# Patient Record
Sex: MALE | Race: WHITE
[De-identification: names, ages, dates, MRNs, and addresses within clinical notes are randomized per-mention and may not be internally consistent; named-entity substitution may affect disease eponyms.]

---

## 2017-07-23 ENCOUNTER — HOSPITAL ENCOUNTER (INPATIENT)
Dept: HOSPITAL 47 - EC | Age: 62
LOS: 4 days | Discharge: HOME | DRG: 247 | End: 2017-07-27
Payer: COMMERCIAL

## 2017-07-23 VITALS — BODY MASS INDEX: 24.3 KG/M2

## 2017-07-23 DIAGNOSIS — E11.9: ICD-10-CM

## 2017-07-23 DIAGNOSIS — I25.10: ICD-10-CM

## 2017-07-23 DIAGNOSIS — I31.9: ICD-10-CM

## 2017-07-23 DIAGNOSIS — I25.82: ICD-10-CM

## 2017-07-23 DIAGNOSIS — F41.9: ICD-10-CM

## 2017-07-23 DIAGNOSIS — E78.5: ICD-10-CM

## 2017-07-23 DIAGNOSIS — I21.09: Primary | ICD-10-CM

## 2017-07-23 LAB
ALP SERPL-CCNC: 68 U/L (ref 38–126)
ALT SERPL-CCNC: 35 U/L (ref 21–72)
AMYLASE SERPL-CCNC: 56 U/L (ref 30–110)
ANION GAP SERPL CALC-SCNC: 14 MMOL/L
APTT BLD: 24.8 SEC (ref 22–30)
AST SERPL-CCNC: 60 U/L (ref 17–59)
BASOPHILS # BLD AUTO: 0.1 K/UL (ref 0–0.2)
BASOPHILS NFR BLD AUTO: 0 %
BUN SERPL-SCNC: 20 MG/DL (ref 9–20)
CALCIUM SPEC-MCNC: 9.5 MG/DL (ref 8.4–10.2)
CH: 30.6
CHCM: 34.4
CHLORIDE SERPL-SCNC: 103 MMOL/L (ref 98–107)
CO2 SERPL-SCNC: 23 MMOL/L (ref 22–30)
EOSINOPHIL # BLD AUTO: 0.1 K/UL (ref 0–0.7)
EOSINOPHIL NFR BLD AUTO: 1 %
ERYTHROCYTE [DISTWIDTH] IN BLOOD BY AUTOMATED COUNT: 5.37 M/UL (ref 4.3–5.9)
ERYTHROCYTE [DISTWIDTH] IN BLOOD: 14.4 % (ref 11.5–15.5)
GLUCOSE BLD-MCNC: 168 MG/DL (ref 75–99)
GLUCOSE SERPL-MCNC: 171 MG/DL (ref 74–99)
HCT VFR BLD AUTO: 47.9 % (ref 39–53)
HDW: 2.52
HGB BLD-MCNC: 16.2 GM/DL (ref 13–17.5)
INR PPP: 1 (ref ?–1.2)
LUC NFR BLD AUTO: 1 %
LYMPHOCYTES # SPEC AUTO: 0.9 K/UL (ref 1–4.8)
LYMPHOCYTES NFR SPEC AUTO: 7 %
MAGNESIUM SPEC-SCNC: 1.8 MG/DL (ref 1.6–2.3)
MCH RBC QN AUTO: 30.2 PG (ref 25–35)
MCHC RBC AUTO-ENTMCNC: 33.8 G/DL (ref 31–37)
MCV RBC AUTO: 89.3 FL (ref 80–100)
MONOCYTES # BLD AUTO: 0.9 K/UL (ref 0–1)
MONOCYTES NFR BLD AUTO: 7 %
NEUTROPHILS # BLD AUTO: 10.3 K/UL (ref 1.3–7.7)
NEUTROPHILS NFR BLD AUTO: 84 %
NON-AFRICAN AMERICAN GFR(MDRD): >60
POTASSIUM SERPL-SCNC: 3.8 MMOL/L (ref 3.5–5.1)
PROT SERPL-MCNC: 7.2 G/DL (ref 6.3–8.2)
PT BLD: 10.5 SEC (ref 9–12)
SODIUM SERPL-SCNC: 140 MMOL/L (ref 137–145)
WBC # BLD AUTO: 0.14 10*3/UL
WBC # BLD AUTO: 12.3 K/UL (ref 3.8–10.6)
WBC (PEROX): 11.1

## 2017-07-23 PROCEDURE — 82150 ASSAY OF AMYLASE: CPT

## 2017-07-23 PROCEDURE — 83036 HEMOGLOBIN GLYCOSYLATED A1C: CPT

## 2017-07-23 PROCEDURE — 93306 TTE W/DOPPLER COMPLETE: CPT

## 2017-07-23 PROCEDURE — 85610 PROTHROMBIN TIME: CPT

## 2017-07-23 PROCEDURE — 86618 LYME DISEASE ANTIBODY: CPT

## 2017-07-23 PROCEDURE — 85025 COMPLETE CBC W/AUTO DIFF WBC: CPT

## 2017-07-23 PROCEDURE — 83880 ASSAY OF NATRIURETIC PEPTIDE: CPT

## 2017-07-23 PROCEDURE — 93005 ELECTROCARDIOGRAM TRACING: CPT

## 2017-07-23 PROCEDURE — 81003 URINALYSIS AUTO W/O SCOPE: CPT

## 2017-07-23 PROCEDURE — 80061 LIPID PANEL: CPT

## 2017-07-23 PROCEDURE — 94760 N-INVAS EAR/PLS OXIMETRY 1: CPT

## 2017-07-23 PROCEDURE — 83735 ASSAY OF MAGNESIUM: CPT

## 2017-07-23 PROCEDURE — 71010: CPT

## 2017-07-23 PROCEDURE — 85347 COAGULATION TIME ACTIVATED: CPT

## 2017-07-23 PROCEDURE — 83690 ASSAY OF LIPASE: CPT

## 2017-07-23 PROCEDURE — 82553 CREATINE MB FRACTION: CPT

## 2017-07-23 PROCEDURE — 36415 COLL VENOUS BLD VENIPUNCTURE: CPT

## 2017-07-23 PROCEDURE — 82550 ASSAY OF CK (CPK): CPT

## 2017-07-23 PROCEDURE — 84484 ASSAY OF TROPONIN QUANT: CPT

## 2017-07-23 PROCEDURE — 85730 THROMBOPLASTIN TIME PARTIAL: CPT

## 2017-07-23 PROCEDURE — 80048 BASIC METABOLIC PNL TOTAL CA: CPT

## 2017-07-23 PROCEDURE — 80053 COMPREHEN METABOLIC PANEL: CPT

## 2017-07-23 PROCEDURE — 93458 L HRT ARTERY/VENTRICLE ANGIO: CPT

## 2017-07-23 NOTE — ED
General Adult HPI





- General


Source: patient, RN notes reviewed


Mode of arrival: ambulatory


Limitations: no limitations





<Amanda Hopson - Last Filed: 07/24/17 00:13>





<René Pimentel - Last Filed: 07/24/17 00:20>





- General


Chief complaint: Abdominal Pain


Stated complaint: pancreatic pain


Time Seen by Provider: 07/23/17 22:34





- History of Present Illness


Initial comments: 





70-year-old male presents to the emergency department with a chief complaint of 

abdominal pain.  Patient states she's having this left-sided abdominal pain 

radiates up into his left shoulder.  Patient states that he has a history of 

pancreatitis but states that he has an attack a few years but has increased 

pain today.  Patient denies any vomiting with this.  Patient denies any changes 

in urination.  Patient was concerned due to the symptoms so he thought that he 

should be evaluated. Patient denies any recent fever, chills, shortness of 

breath, chest pain, back pain, nausea vomiting, numbness or tingling, dysuria 

or hematuria, constipation or diarrhea, headaches or visual changes, or any 

other current symptoms. (Amanda Hopson)





- Related Data


 Allergies











Allergy/AdvReac Type Severity Reaction Status Date / Time


 


No Known Allergies Allergy   Verified 07/23/17 22:31














Review of Systems


ROS Other: All systems not noted in ROS Statement are negative.





<Amanda Hopson - Last Filed: 07/24/17 00:13>


ROS Other: All systems not noted in ROS Statement are negative.





<René Pimentel - Last Filed: 07/24/17 00:20>


ROS Statement: 


Those systems with pertinent positive or pertinent negative responses have been 

documented in the HPI.








Past Medical History


Past Medical History: Diabetes Mellitus


History of Any Multi-Drug Resistant Organisms: None Reported


Past Surgical History: No Surgical Hx Reported


Past Psychological History: No Psychological Hx Reported


Smoking Status: Never smoker


Past Alcohol Use History: None Reported


Past Drug Use History: None Reported





<Amanda Hopson - Last Filed: 07/24/17 00:13>





General Exam


Limitations: no limitations





<Amanda Hopson - Last Filed: 07/24/17 00:13>





<René Pimentel - Last Filed: 07/24/17 00:20>





- General Exam Comments


Initial Comments: 





General:  The patient is awake and alert, in no distress, and does not appear 

acutely ill. 


Eye:  Pupils are equal, round and reactive to light, extra-ocular movements are 

intact; there is normal conjunctiva bilaterally.  No signs of icterus.  


Ears, nose, mouth and throat:  There are moist mucous membranes.


Neck:  The neck is supple, there is no tenderness.


Cardiovascular:  There is a regular rate and rhythm. No murmur, rub or gallop 

is appreciated.


Respiratory:  Lungs are clear to auscultation, respirations are non-labored, 

breath sounds are equal.  No wheezes, stridor, rales, or rhonchi.


Gastrointestinal:  Soft, non-distended, non-tender abdomen without masses or 

organomegaly noted. There is no rebound or guarding present.  No CVA 

tenderness. Bowel sounds are unremarkable.


Back:  There is no tenderness to palpation in the midline. There is no obvious 

deformity. No rashes noted. 


Musculoskeletal:  Normal ROM, no tenderness, There is no pedal edema. There is 

no calf tenderness or swelling. Sensation intact. Pulses equal bilaterally 2+.  


Neurological:  CN II-XII intact, There are no obvious motor or sensory 

deficits. Coordination appears grossly intact. Speech is normal.


Skin:  Skin is warm and dry and no rashes or lesions are noted. 


Psychiatric:  Cooperative, appropriate mood & affect, normal judgment.  


 (Amanda Hopson)





Course





<Amanda Hopson - Last Filed: 07/24/17 00:13>





<René Pimentel - Last Filed: 07/24/17 00:20>





 Vital Signs











  07/23/17





  22:27


 


Temperature 100.5 F H


 


Pulse Rate 108 H


 


Respiratory 20





Rate 


 


Blood Pressure 138/83


 


O2 Sat by Pulse 98





Oximetry 














- Reevaluation(s)


Reevaluation #1: 





07/23/17 23:54


On further questioning at this time patient does admit that he did have some 

chest pressure earlier today patient states he also had chest pain yesterday.  

and he did have a pain radiating down his left arm numbness and tingling to the 

left arm..  At this time patient is pain-free.  We will give him an aspirin.





 (Amanda Hopson)





EKG Findings





- EKG Comments:


EKG Findings:: Normal sinus rhythm, left axis deviation, left ventricular 

hypertrophy, prolonged QT, concern for ST elevation in V4 to V5 the 6 and V3 no 

reciprocal changes appreciated





<Amanda Hopson - Last Filed: 07/24/17 00:13>





Medical Decision Making





- Lab Data


Result diagrams: 


 07/23/17 23:20





 07/23/17 23:20





- Radiology Data


Radiology results: report reviewed, image reviewed





<Amanda Hopson - Last Filed: 07/24/17 00:13>





- Lab Data


Result diagrams: 


 07/23/17 23:20





 07/23/17 23:20





<René Pimentel - Last Filed: 07/24/17 00:20>





- Medical Decision Making





62-year-old male presents to the emergency department with a chief complaint of 

abdominal pain.  At this time the patient's lab work is been reviewed.  An EKG 

was received and there is concern for possible acute MI.  Cardiology was 

contacted via Dr. Pimentel and they are requesting that the patient go to the 

Cath Lab.  At this time STEMI was alerted.  Patient is given an aspirin here in 

the emergency department.  Family was informed.  This time patient will be 

going to the Cath Lab for additional workup. (Amanda Hopson)





62-year-old male presenting with chief complaint of abdominal pain.  Patient 

was pain-free upon arrival.  After initial history was obtained patient did 

report some abdominal pain and left arm pain.  EKG was obtained at 2338.  EKG 

shows ST segment elevation in the precordium, there is no reciprocal change 

noted.  EKG showed ventricular rate of 70, ME interval 138, QS duration 104, 

QTC is prolonged at 501.  Cardiology was called at 2354.  Cath Lab was 

activated.  Patient was given aspirin and heparin bolus in the emergency 

department.  Patient will be taken emergently to the Cath Lab.





Diagnosis: ST segment elevation MI (René Pimentel)





- Lab Data





 Lab Results











  07/23/17 07/23/17 07/23/17 Range/Units





  23:09 23:20 23:20 


 


WBC    12.3 H  (3.8-10.6)  k/uL


 


RBC    5.37  (4.30-5.90)  m/uL


 


Hgb    16.2  (13.0-17.5)  gm/dL


 


Hct    47.9  (39.0-53.0)  %


 


MCV    89.3  (80.0-100.0)  fL


 


MCH    30.2  (25.0-35.0)  pg


 


MCHC    33.8  (31.0-37.0)  g/dL


 


RDW    14.4  (11.5-15.5)  %


 


Plt Count    215  (150-450)  k/uL


 


Neutrophils %    84  %


 


Lymphocytes %    7  %


 


Monocytes %    7  %


 


Eosinophils %    1  %


 


Basophils %    0  %


 


Neutrophils #    10.3 H  (1.3-7.7)  k/uL


 


Lymphocytes #    0.9 L  (1.0-4.8)  k/uL


 


Monocytes #    0.9  (0-1.0)  k/uL


 


Eosinophils #    0.1  (0-0.7)  k/uL


 


Basophils #    0.1  (0-0.2)  k/uL


 


PT     (9.0-12.0)  sec


 


INR     (<1.2)  


 


APTT     (22.0-30.0)  sec


 


Sodium   140   (137-145)  mmol/L


 


Potassium   3.8   (3.5-5.1)  mmol/L


 


Chloride   103   ()  mmol/L


 


Carbon Dioxide   23   (22-30)  mmol/L


 


Anion Gap   14   mmol/L


 


BUN   20   (9-20)  mg/dL


 


Creatinine   0.80   (0.66-1.25)  mg/dL


 


Est GFR (MDRD) Af Amer   >60   (>60 ml/min/1.73 sqM)  


 


Est GFR (MDRD) Non-Af   >60   (>60 ml/min/1.73 sqM)  


 


Glucose   171 H   (74-99)  mg/dL


 


POC Glucose (mg/dL)  168 H    (75-99)  mg/dL


 


POC Glu Operater ID  Liliana Ruby    


 


Calcium   9.5   (8.4-10.2)  mg/dL


 


Magnesium   1.8   (1.6-2.3)  mg/dL


 


Total Bilirubin   1.1   (0.2-1.3)  mg/dL


 


AST   60 H   (17-59)  U/L


 


ALT   35   (21-72)  U/L


 


Alkaline Phosphatase   68   ()  U/L


 


Total Protein   7.2   (6.3-8.2)  g/dL


 


Albumin   4.1   (3.5-5.0)  g/dL


 


Amylase   56   ()  U/L


 


Lipase   87   ()  U/L














  07/23/17 Range/Units





  23:20 


 


WBC   (3.8-10.6)  k/uL


 


RBC   (4.30-5.90)  m/uL


 


Hgb   (13.0-17.5)  gm/dL


 


Hct   (39.0-53.0)  %


 


MCV   (80.0-100.0)  fL


 


MCH   (25.0-35.0)  pg


 


MCHC   (31.0-37.0)  g/dL


 


RDW   (11.5-15.5)  %


 


Plt Count   (150-450)  k/uL


 


Neutrophils %   %


 


Lymphocytes %   %


 


Monocytes %   %


 


Eosinophils %   %


 


Basophils %   %


 


Neutrophils #   (1.3-7.7)  k/uL


 


Lymphocytes #   (1.0-4.8)  k/uL


 


Monocytes #   (0-1.0)  k/uL


 


Eosinophils #   (0-0.7)  k/uL


 


Basophils #   (0-0.2)  k/uL


 


PT  10.5  (9.0-12.0)  sec


 


INR  1.0  (<1.2)  


 


APTT  24.8  (22.0-30.0)  sec


 


Sodium   (137-145)  mmol/L


 


Potassium   (3.5-5.1)  mmol/L


 


Chloride   ()  mmol/L


 


Carbon Dioxide   (22-30)  mmol/L


 


Anion Gap   mmol/L


 


BUN   (9-20)  mg/dL


 


Creatinine   (0.66-1.25)  mg/dL


 


Est GFR (MDRD) Af Amer   (>60 ml/min/1.73 sqM)  


 


Est GFR (MDRD) Non-Af   (>60 ml/min/1.73 sqM)  


 


Glucose   (74-99)  mg/dL


 


POC Glucose (mg/dL)   (75-99)  mg/dL


 


POC Glu Operater ID   


 


Calcium   (8.4-10.2)  mg/dL


 


Magnesium   (1.6-2.3)  mg/dL


 


Total Bilirubin   (0.2-1.3)  mg/dL


 


AST   (17-59)  U/L


 


ALT   (21-72)  U/L


 


Alkaline Phosphatase   ()  U/L


 


Total Protein   (6.3-8.2)  g/dL


 


Albumin   (3.5-5.0)  g/dL


 


Amylase   ()  U/L


 


Lipase   ()  U/L














Disposition


Time of Disposition: 00:07


Decision Date: 07/24/17


Decision Time: 00:07





<Amanda Hopson - Last Filed: 07/24/17 00:13>





<René Pimentel - Last Filed: 07/24/17 00:20>


Clinical Impression: 


 STEMI (ST elevation myocardial infarction)





Disposition: ADMITTED AS IP TO THIS HOSP


Condition: Critical

## 2017-07-24 LAB
CK SERPL-CCNC: 448 U/L (ref 55–170)
GLUCOSE BLD-MCNC: 127 MG/DL (ref 75–99)
GLUCOSE BLD-MCNC: 146 MG/DL (ref 75–99)
GLUCOSE BLD-MCNC: 153 MG/DL (ref 75–99)
GLUCOSE BLD-MCNC: 163 MG/DL (ref 75–99)
GLUCOSE BLD-MCNC: 245 MG/DL (ref 75–99)
GLUCOSE UR QL: (no result)
HEMOGLOBIN A1C: 8.1 % (ref 4.2–6.1)
KETONES UR QL STRIP.AUTO: (no result)
PH UR: 5.5 [PH] (ref 5–8)
SP GR UR: >1.05 (ref 1–1.03)
TROPONIN I SERPL-MCNC: 7.79 NG/ML (ref 0–0.03)
UA BILLING (MACRO VS. MICRO): (no result)
UROBILINOGEN UR QL STRIP: <2 MG/DL (ref ?–2)

## 2017-07-24 PROCEDURE — B2151ZZ FLUOROSCOPY OF LEFT HEART USING LOW OSMOLAR CONTRAST: ICD-10-PCS

## 2017-07-24 PROCEDURE — B2111ZZ FLUOROSCOPY OF MULTIPLE CORONARY ARTERIES USING LOW OSMOLAR CONTRAST: ICD-10-PCS

## 2017-07-24 PROCEDURE — 4A023N7 MEASUREMENT OF CARDIAC SAMPLING AND PRESSURE, LEFT HEART, PERCUTANEOUS APPROACH: ICD-10-PCS

## 2017-07-24 PROCEDURE — 027034Z DILATION OF CORONARY ARTERY, ONE ARTERY WITH DRUG-ELUTING INTRALUMINAL DEVICE, PERCUTANEOUS APPROACH: ICD-10-PCS

## 2017-07-24 RX ADMIN — INSULIN LISPRO SCH UNIT: 100 INJECTION, SOLUTION INTRAVENOUS; SUBCUTANEOUS at 09:18

## 2017-07-24 RX ADMIN — DOCUSATE SODIUM PRN MG: 100 CAPSULE, LIQUID FILLED ORAL at 21:07

## 2017-07-24 RX ADMIN — MAGNESIUM SULFATE IN DEXTROSE SCH MLS/HR: 10 INJECTION, SOLUTION INTRAVENOUS at 06:39

## 2017-07-24 RX ADMIN — MAGNESIUM SULFATE IN DEXTROSE SCH MLS/HR: 10 INJECTION, SOLUTION INTRAVENOUS at 05:37

## 2017-07-24 RX ADMIN — NITROGLYCERIN ONE MCG: 10 INJECTION INTRAVENOUS at 01:14

## 2017-07-24 RX ADMIN — NITROGLYCERIN ONE MCG: 10 INJECTION INTRAVENOUS at 01:19

## 2017-07-24 RX ADMIN — SPIRONOLACTONE SCH MG: 25 TABLET, FILM COATED ORAL at 09:19

## 2017-07-24 RX ADMIN — ACETAMINOPHEN PRN MG: 325 TABLET, FILM COATED ORAL at 21:06

## 2017-07-24 RX ADMIN — INSULIN LISPRO SCH UNIT: 100 INJECTION, SOLUTION INTRAVENOUS; SUBCUTANEOUS at 18:41

## 2017-07-24 RX ADMIN — ASPIRIN 81 MG CHEWABLE TABLET SCH MG: 81 TABLET CHEWABLE at 09:18

## 2017-07-24 RX ADMIN — METOPROLOL TARTRATE SCH MG: 25 TABLET, FILM COATED ORAL at 09:19

## 2017-07-24 RX ADMIN — INSULIN LISPRO SCH UNIT: 100 INJECTION, SOLUTION INTRAVENOUS; SUBCUTANEOUS at 22:19

## 2017-07-24 RX ADMIN — LISINOPRIL SCH MG: 10 TABLET ORAL at 17:04

## 2017-07-24 RX ADMIN — INSULIN LISPRO SCH UNIT: 100 INJECTION, SOLUTION INTRAVENOUS; SUBCUTANEOUS at 13:30

## 2017-07-24 RX ADMIN — METOPROLOL TARTRATE SCH MG: 25 TABLET, FILM COATED ORAL at 21:08

## 2017-07-24 NOTE — CC
Mr. Lea is a 62 -year-old gentleman who had an episode of chest discomfort 
yesterday and this lasted four about an hour and a half and then subsequently 
he had reoccurrence of the pain today.  The pain today was pleuritic in nature.
  EKG is suggestive of recent anterior myocardial infarction with diffuse ST 
segment elevation .  Clinically at present, the patient did have evidence of 
pericardia rub, suggestive of post myocardial infarction, pericarditis. The 
patient possibly had myocardial infarction more than 24 hours ago but in view 
of the persistent ST segment elevation, the patient was advised cardiac 
catheterization for definitive diagnosis.  



PROCEDURE:   Right groin was prepped and draped in the usual manner.  The skin 
was infiltrated with 2% Xylocaine.   The right femoral artery was entered using 
Seldinger technique and #6 Eston[place] sheath was placed in.  Selective coronary 
angiography was then performed in multiple projections.  Left ventriculography 
was performed.  The patient tolerated the procedure well.   



HEMODYNAMICS:  Left ventricular end diastolic pressure was 12 to 14 mmHg prior 
to angiography.  No gradient is noted across the aortic valve.  



SELECTIVE CORONARY ANGIOGRAPHY: 



LEFT MAIN:  Left main coronary artery is normal. 

LAD:  Good caliber blood vessels.  The mid LAD is totally occluded.  Proximal 
to that, it does not fill very well and it is possible that it is full of 
clots.  

CIRCUMFLEX CORONARY ARTERY:  Good caliber blood vessel and gives rise to good 
sized obtuse marginal branch.  

The second obtuse marginal branch has 90% stenosis.  The right coronary artery 
is mildly diffusely diseased and PDA branch has 90% stenosis.  Beyond that, the 
PDA branch is a very small caliber blood vessel and is diffusely diseased.  



FINAL IMPRESSION: This study shows the mid LAD is totally occluded.  There are 
filling defects in the proximal to that suggestive of possible thrombus. The 
second obtuse marginal branch has 90% stenosis.  The PDA branch has 99% 
stenosis.  Beyond that, the PDA branch is a very small caliber blood vessel.  
Left ventriculography  reveals anterior apical and inferior apical hypokinesia 
with ejection fraction of 35%. 



RECOMMENDATIONS: Films were reviewed with Dr. Faria. We will proceed with 
stent to the LAD. 
[facility]

## 2017-07-24 NOTE — CONS
Mr. Lea is a 62-year-old gentleman who is seen in the emergency room with 
history suggestive of acute myocardial infarction.  This patient gives a 
history that he actually had an episode of chest discomfort with heaviness in 
the chest yesterday which lasted for about an hour or more than an hour.  It 
was not associated with any nausea, vomiting, or shortness of breath.  
Subsequently, he got better.  Today, the patient started around 7:00.  The pain 
was on the left side and also in the left shoulder and pain was sharp.  He also 
had some chills associated with it.  EKG was performed which shows evidence of 
ST segment elevation suggestive of acute myocardial infarction with T-wave 
inversions noted from V3 to V6.  Patient denies any past history of angina.  He 
has a history of diabetes.  No history of hypertension.  He is otherwise 
physically active.



PAST MEDICAL HISTORY:  Includes the history of cholecystectomy.



REVIEW OF SYSTEM:  Is otherwise unremarkable.



Physical examination at present revealed a 62-year-old gentleman who does not 
appear to be in any acute distress.  Patient's heart rate is 60 per minute, the 
blood pressure is 130/80 mmHg.

HEENT examination is negative.  Neck is supple. There is no increase in jugular 
venous pressure.  Both the carotid pulses are felt.  There is no bruit.  Chest 
is symmetrical.  Heart, the PMI is not felt.  First and second heart sounds are 
normal.  There is evidence of pericardial rub.  Lungs are clinically clear to 
auscultation and percussion.  Abdomen is soft.  Extremities very thorough 
pulses and 2+.  



EKG shows normal sinus rhythm with diffuse ST segment elevation from V3 to V6 
as well as in the inferior leads with evidence of T-wave inversions noted in V3 
to V6.  Patient's electrolytes are normal.  Chest x-ray is normal. Troponin is 
7.7.



FINAL IMPRESSION:  It appears that the patient most likely suffered an acute 
myocardial infarction 24 hours ago and now he has evidence of post-myocardial 
infarction, pericarditis and pericardial rub.  EKG is suggestive of recent 
anterior wall myocardial infarction.



RECOMMENDATIONS:  We will proceed with cardiac catheterization for definitive 
diagnosis.  The procedure and risks were fully explained to the patient.  
[facility]

## 2017-07-24 NOTE — PCN
Mr. Lea is a 62-year-old male who presented to the emergency room with 
symptoms of chest discomfort.  His pain started yesterday. His EKG showed ST 
segment elevation in the anterior leads and he had elevation of troponin, 
underwent cardiac catheterization by Dr. ELISABETH Aguilar, was found to have 
subtotally occluded mid LAD in long segment. In view of that the recommendation 
was made regarding angioplasty and stenting. the procedure as well as risks and 
complications were discussed with the patient who is in full understanding and 
agreement. 



PROCEDURE: A 6-Bhutanese FR4 guiding catheter was introduced into the system. 
After cannulating the left main a 0.014 balanced medium weight J wire was 
advanced across the lesion and positioned distally and then a 2.5 x 12 mm trek 
balloon was advanced and 2 inflations at maximum of 8 atmospheres were done. 
Following that the balloon was removed and 2.25 x 23 mm Xience alpine stent was 
deployed. Post dilated at 14 atmospheres. After the last inflation after 
appropriate wait, the balloon and guidewire were withdrawn back into the 
guiding catheter. Images were obtained and repeated. Those images revealed 
stable successful stenting. At that point, the guiding catheter, the balloon 
and guidewire were removed. The sheath was removed. Hemostasis was obtained 
with deployment of Angio-Seal. there were no immediate compilations. The 
patient was returned to his room in stable condition. Of note the patient 
received Angiomax per protocol as well as oral loading dose of Effient. He had  
chest discomfort with the inflation that resolved at the end of the procedure. 



RESULTS:  

Successful stenting of the long segment of the mid distal left anterior 
descending with reduction in stenosis from 99% to 0%. There was diffuse disease 
in the very distal segment of the LAD. 



RECOMMENDATIONS:  The patient will be continued on aspirin, Effient, beta-
blocker and simvastatin. The importance of dual antiplatelet treatment was 
discussed with the patient and his family who are in full understanding and 
agreement. 



DURATION OF THE PROCEDURE: 28 minutes.  
GONS[facility]

## 2017-07-24 NOTE — ECHOF
Referral Reason:mi



MEASUREMENTS

--------

HEIGHT: 180.3 cm

WEIGHT: 78.0 kg

BP: 121/71

RVIDd:   3.1 cm     (< 3.3)

IVSd:   1.3 cm     (0.6 - 1.1)

LVIDd:   5.1 cm     (3.9 - 5.3)

LVPWd:   1.3 cm     (0.6 - 1.1)

IVSs:   1.6 cm

LVIDs:   4.0 cm

LVPWs:   1.5 cm

LA Diam:   3.7 cm     (2.7 - 3.8)

LAESV Index (A-L):   19.37 ml/m

Ao Diam:   3.6 cm     (2.0 - 3.7)

AV Cusp:   2.4 cm     (1.5 - 2.6)

MV EXCURSION:   13.015 mm     (> 18.000)

MV EF SLOPE:   53 mm/s     (70 - 150)

EPSS:   1.3 cm

MV E Sourav:   0.56 m/s

MV DecT:   252 ms

MV A Sourav:   0.58 m/s

MV E/A Ratio:   0.96 

RAP:   15.00 mmHg

RVSP:   31.00 mmHg







FINDINGS

--------

Sinus rhythm.

This was a technically good study.

The left ventricular size is normal.   There is mild 

concentric left ventricular hypertrophy.   Overall left 

ventricular systolic function is mild-moderately 

impaired with, an EF between 40 - 45 %.   Basal 

inferior LV wall motion is hypokinetic.    Apical 

anterior LV wall motion is hypokinetic.    Apical 

lateral LV wall motion is hypokinetic.    Apical 

inferior LV wall motion is hypokinetic.    Apical 

septum LV wall motion is hypokinetic.

The right ventricle is normal in size.

Normal LA  size by volume 22+/-6 ml/m2.

The right atrium is normal in size.

The aortic valve is trileaflet and appears structurally 

normal.

The mitral valve is normal.

Mild tricuspid regurgitation present.   Right 

ventricular systolic pressure is normal at < 35 mmHg.

Pulmonic valve appears structurally normal.

The aortic root size is normal.

The inferior vena cava is dilated with poor inspiratory 

collapse which is consistent with estimated right 

atrial pressure of 20 mmHg.

There is no pericardial effusion.



CONCLUSIONS

--------

1. Sinus rhythm.

2. Apical septum LV wall motion is hypokinetic.

3. The right ventricle is normal in size.

4. Normal LA size by volume 22+/-6 ml/m2.

5. The aortic valve is trileaflet and appears structurally 

normal.

6. The mitral valve is normal.

7. Mild tricuspid regurgitation present.

8. Right ventricular systolic pressure is normal at < 35 

mmHg.

9. Pulmonic valve appears structurally normal.

10. The aortic root size is normal.

11. The inferior vena cava is dilated with poor inspiratory 

collapse which is consistent with estimated right 

atrial pressure of 20 mmHg.

12. This was a technically good study.

13. There is no pericardial effusion.

14. The left ventricular size is normal.

15. There is mild concentric left ventricular hypertrophy.

16. Overall left ventricular systolic function is 

mild-moderately impaired with, an EF between 40 - 45 %.

17. Basal inferior LV wall motion is hypokinetic.

18. Apical anterior LV wall motion is hypokinetic.

19. Apical lateral LV wall motion is hypokinetic.

20. Apical inferior LV wall motion is hypokinetic.





SONOGRAPHER: Denisse Sabillon RDCS

## 2017-07-24 NOTE — P.PN
Subjective


Principal diagnosis: 


Subacute anterolateral MI, pericarditis








This is a 62-year-old gentleman was evaluated by cardiac catheterization last 

night for findings of subacute anterior wall MI.  Patient had stent placement 

of the left anterior descending coronary artery.  He also has a tight stenosis 

involving the OM branch of the circumflex.  Patient is complaining of some 

chest discomfort which is different than the pain that he had previously.  The 

pain increases on deep breathing.  There appears to be pericardial rub and 

pain.  Most probably is related to pericarditis.  He denies any shortness of 

breath and overall is feeling better





Objective





- Vital Signs


Vital signs: 


 Vital Signs











Temp  98.6 F   07/24/17 08:00


 


Pulse  80   07/24/17 14:00


 


Resp  26 H  07/24/17 14:00


 


BP  115/73   07/24/17 12:00


 


Pulse Ox  98   07/24/17 12:00








 Intake & Output











 07/23/17 07/24/17 07/24/17





 18:59 06:59 18:59


 


Intake Total  971 300


 


Output Total  500 


 


Balance  471 300


 


Weight  79 kg 


 


Intake:   


 


  IV  771 300


 


    Magnesium Sulfate-D5w Pmx  200 





    1 gm In Dextrose/Water 1   





    100ml.bag @ 100 mls/hr   





    IVPB Q1H [facility] Rx#:   





    [id number]   


 


    Sodium Chloride 0.9% 1,  500 300





    000 ml @ 100 mls/hr IV .   





    Q10H [facility] Rx#:[id number]   


 


  Oral  200 


 


Output:   


 


  Urine  500 


 


Other:   


 


  Voiding Method  Urinal Urinal


 


  # Voids   1














- Exam


GENERAL EXAM: Patient is alert and oriented and doesn't appear to be in any 

acute distress


HEENT: Normocephalic. Normal reaction of pupils, equal size, normal range of 

extraocular motion. No erythema or exudates in the throat.


NECK: No masses, no nuchal rigidity.


CHEST: No chest wall deformity.


LUNGS: Equal air entry with no crackles or wheeze.


HEART: S1 and S2 normal with no audible mumurs or gallops.  Pericardial rub 

heard.


ABDOMEN: No hepatosplenomegaly, normal bowel sounds, no guarding or rigidity.


SKIN: No rashes


CENTRAL NERVOUS SYSTEM: No focal deficits.


EXTREMITIES: No cyanosis, clubbing or edema.








- Labs


CBC & Chem 7: 


 07/23/17 23:20





 07/23/17 23:20


Labs: 


 Abnormal Lab Results - Last 24 Hours (Table)











  07/23/17 07/23/17 07/23/17 Range/Units





  23:09 23:20 23:20 


 


WBC     (3.8-10.6)  k/uL


 


Neutrophils #     (1.3-7.7)  k/uL


 


Lymphocytes #     (1.0-4.8)  k/uL


 


APTT     (22.0-30.0)  sec


 


Glucose   171 H   (74-99)  mg/dL


 


POC Glucose (mg/dL)  168 H    (75-99)  mg/dL


 


Hemoglobin A1c     (4.2-6.1)  %


 


AST   60 H   (17-59)  U/L


 


Total Creatine Kinase    448 H  ()  U/L


 


CK-MB (CK-2)    33.5 H*  (0.0-2.4)  ng/mL


 


Troponin I    7.790 H*  (0.000-0.034)  ng/mL


 


Ur Specific Gravity     (1.001-1.035)  


 


Urine Protein     (Negative)  


 


Urine Glucose (UA)     (Negative)  


 


Urine Ketones     (Negative)  














  07/23/17 07/24/17 07/24/17 Range/Units





  23:20 01:56 02:51 


 


WBC  12.3 H    (3.8-10.6)  k/uL


 


Neutrophils #  10.3 H    (1.3-7.7)  k/uL


 


Lymphocytes #  0.9 L    (1.0-4.8)  k/uL


 


APTT     (22.0-30.0)  sec


 


Glucose     (74-99)  mg/dL


 


POC Glucose (mg/dL)   127 H   (75-99)  mg/dL


 


Hemoglobin A1c     (4.2-6.1)  %


 


AST     (17-59)  U/L


 


Total Creatine Kinase     ()  U/L


 


CK-MB (CK-2)     (0.0-2.4)  ng/mL


 


Troponin I     (0.000-0.034)  ng/mL


 


Ur Specific Gravity    >1.050 H  (1.001-1.035)  


 


Urine Protein    Trace H  (Negative)  


 


Urine Glucose (UA)    4+ H  (Negative)  


 


Urine Ketones    1+ H  (Negative)  














  07/24/17 07/24/17 07/24/17 Range/Units





  05:24 05:24 05:24 


 


WBC     (3.8-10.6)  k/uL


 


Neutrophils #     (1.3-7.7)  k/uL


 


Lymphocytes #     (1.0-4.8)  k/uL


 


APTT  36.6 H    (22.0-30.0)  sec


 


Glucose     (74-99)  mg/dL


 


POC Glucose (mg/dL)     (75-99)  mg/dL


 


Hemoglobin A1c    8.1 H  (4.2-6.1)  %


 


AST     (17-59)  U/L


 


Total Creatine Kinase     ()  U/L


 


CK-MB (CK-2)     (0.0-2.4)  ng/mL


 


Troponin I   21.300 H*   (0.000-0.034)  ng/mL


 


Ur Specific Gravity     (1.001-1.035)  


 


Urine Protein     (Negative)  


 


Urine Glucose (UA)     (Negative)  


 


Urine Ketones     (Negative)  














  07/24/17 07/24/17 07/24/17 Range/Units





  07:34 11:25 12:27 


 


WBC     (3.8-10.6)  k/uL


 


Neutrophils #     (1.3-7.7)  k/uL


 


Lymphocytes #     (1.0-4.8)  k/uL


 


APTT     (22.0-30.0)  sec


 


Glucose     (74-99)  mg/dL


 


POC Glucose (mg/dL)  146 H   163 H  (75-99)  mg/dL


 


Hemoglobin A1c     (4.2-6.1)  %


 


AST     (17-59)  U/L


 


Total Creatine Kinase     ()  U/L


 


CK-MB (CK-2)     (0.0-2.4)  ng/mL


 


Troponin I   13.200 H*   (0.000-0.034)  ng/mL


 


Ur Specific Gravity     (1.001-1.035)  


 


Urine Protein     (Negative)  


 


Urine Glucose (UA)     (Negative)  


 


Urine Ketones     (Negative)  














Assessment and Plan


(1) Pericarditis


Status: Acute   





(2) STEMI (ST elevation myocardial infarction)


Status: Acute   





(3) CAD (coronary artery disease)


Status: Acute   


Plan: 


Pt seems  to be relatively stable.  Complaints of mild chest discomfort 

suggestive of pericarditis.  Echo Cardigan showed ejection fraction about 4045% 

with segmental wall motion defects.  Patient activity to be increased 

gradually.  He should be considered for intervention involving the OM branch of 

the circumflex before discharge.  Further recommendations depend upon the 

clinical course

## 2017-07-25 LAB
ANION GAP SERPL CALC-SCNC: 7 MMOL/L
BUN SERPL-SCNC: 21 MG/DL (ref 9–20)
CALCIUM SPEC-MCNC: 8.4 MG/DL (ref 8.4–10.2)
CHLORIDE SERPL-SCNC: 106 MMOL/L (ref 98–107)
CHOLEST SERPL-MCNC: 142 MG/DL (ref ?–200)
CO2 SERPL-SCNC: 25 MMOL/L (ref 22–30)
GLUCOSE BLD-MCNC: 147 MG/DL (ref 75–99)
GLUCOSE BLD-MCNC: 168 MG/DL (ref 75–99)
GLUCOSE BLD-MCNC: 182 MG/DL (ref 75–99)
GLUCOSE BLD-MCNC: 229 MG/DL (ref 75–99)
GLUCOSE SERPL-MCNC: 152 MG/DL (ref 74–99)
HDLC SERPL-MCNC: 35 MG/DL (ref 40–60)
MAGNESIUM SPEC-SCNC: 2.2 MG/DL (ref 1.6–2.3)
NON-AFRICAN AMERICAN GFR(MDRD): >60
POTASSIUM SERPL-SCNC: 4.5 MMOL/L (ref 3.5–5.1)
SODIUM SERPL-SCNC: 138 MMOL/L (ref 137–145)
TRIGL SERPL-MCNC: 127 MG/DL (ref ?–150)

## 2017-07-25 RX ADMIN — ACETAMINOPHEN PRN MG: 325 TABLET, FILM COATED ORAL at 08:20

## 2017-07-25 RX ADMIN — INSULIN LISPRO SCH UNIT: 100 INJECTION, SOLUTION INTRAVENOUS; SUBCUTANEOUS at 12:46

## 2017-07-25 RX ADMIN — METOPROLOL TARTRATE SCH MG: 25 TABLET, FILM COATED ORAL at 06:51

## 2017-07-25 RX ADMIN — METOPROLOL TARTRATE SCH MG: 25 TABLET, FILM COATED ORAL at 21:28

## 2017-07-25 RX ADMIN — ACETAMINOPHEN PRN MG: 325 TABLET, FILM COATED ORAL at 16:05

## 2017-07-25 RX ADMIN — DOCUSATE SODIUM PRN MG: 100 CAPSULE, LIQUID FILLED ORAL at 19:14

## 2017-07-25 RX ADMIN — INSULIN LISPRO SCH UNIT: 100 INJECTION, SOLUTION INTRAVENOUS; SUBCUTANEOUS at 17:52

## 2017-07-25 RX ADMIN — PRASUGREL SCH: 10 TABLET, FILM COATED ORAL at 05:04

## 2017-07-25 RX ADMIN — METOPROLOL TARTRATE SCH: 25 TABLET, FILM COATED ORAL at 06:57

## 2017-07-25 RX ADMIN — LISINOPRIL SCH MG: 10 TABLET ORAL at 06:51

## 2017-07-25 RX ADMIN — INSULIN LISPRO SCH: 100 INJECTION, SOLUTION INTRAVENOUS; SUBCUTANEOUS at 06:50

## 2017-07-25 RX ADMIN — INSULIN LISPRO SCH UNIT: 100 INJECTION, SOLUTION INTRAVENOUS; SUBCUTANEOUS at 21:28

## 2017-07-25 RX ADMIN — SPIRONOLACTONE SCH MG: 25 TABLET, FILM COATED ORAL at 10:36

## 2017-07-25 RX ADMIN — ASPIRIN 81 MG CHEWABLE TABLET SCH MG: 81 TABLET CHEWABLE at 06:52

## 2017-07-25 RX ADMIN — ATORVASTATIN CALCIUM SCH: 80 TABLET, FILM COATED ORAL at 21:27

## 2017-07-25 RX ADMIN — ACETAMINOPHEN PRN MG: 325 TABLET, FILM COATED ORAL at 22:54

## 2017-07-25 RX ADMIN — PRASUGREL SCH: 10 TABLET, FILM COATED ORAL at 05:21

## 2017-07-25 NOTE — P.PN
Subjective


Principal diagnosis: 





anterior wall MI


 This is a 62-year-old  gentleman who presented to the hospital with 

an anterior wall myocardial infarction, he underwent angioplasty with stent 

placement of the LAD.  Patient also has a tight stenosis in the OM branch of 

the circumflex for which he is scheduled to undergo angioplasty tomorrow.  

Breathing.  He has been up ambulating without any difficulty.  Patient has been 

instructed regarding the importance of taking all of his medications on a 

regular basis.echocardiogram with Doppler study was performed which revealed an 

ejection fraction of 40-45%.








Objective





- Vital Signs


Vital signs: 


 Vital Signs











Temp  98.6 F   07/25/17 12:00


 


Pulse  80   07/25/17 12:00


 


Resp  16   07/25/17 12:00


 


BP  125/77   07/25/17 12:00


 


Pulse Ox  96   07/25/17 12:00








 Intake & Output











 07/24/17 07/25/17 07/25/17





 18:59 06:59 18:59


 


Intake Total 500 320 240


 


Balance 500 320 240


 


Weight  76.6 kg 


 


Intake:   


 


   320 


 


    Sodium Chloride 0.9% 1, 300 320 





    000 ml @ 100 mls/hr IV .   





    Q10H [facility] Rx#:[id number]   


 


  Oral 200  240


 


Other:   


 


  Voiding Method Urinal  Toilet





   Urinal


 


  # Voids 1 2 1














- Exam





PHYSICAL EXAMINATION: 





HEENT: [Head is atraumatic, normocephalic.  Pupils equal, round.  Neck is 

supple.  There is no elevated jugular venous pressure.]





HEART EXAMINATION: [Heart S1, S2 normal.  No murmur or gallop heard.]





CHEST EXAMINATION:[ Lungs are clear to auscultation and precussion. No chest 

wall tenderness is noted on palpation or with deep breathing.]





ABDOMEN: [ Soft, nontender. Bowel sounds are heard. No organomegaly noted]

.right groin soft, no evidence of any hematoma.


 


EXTREMITIES:[ 2+ peripheral pulses with no evidence of peripheral edema and no 

calf tenderness noted].





NEUROLOGIC [patient is awake, alert and oriented -3.]


 


.


 








- Labs


CBC & Chem 7: 


 07/23/17 23:20





 07/25/17 06:14


Labs: 


 Abnormal Lab Results - Last 24 Hours (Table)











  07/24/17 07/24/17 07/25/17 Range/Units





  17:03 21:24 06:00 


 


BUN     (9-20)  mg/dL


 


Glucose     (74-99)  mg/dL


 


POC Glucose (mg/dL)  153 H  245 H  147 H  (75-99)  mg/dL


 


HDL Cholesterol     (40-60)  mg/dL














  07/25/17 07/25/17 Range/Units





  06:14 12:08 


 


BUN  21 H   (9-20)  mg/dL


 


Glucose  152 H   (74-99)  mg/dL


 


POC Glucose (mg/dL)   182 H  (75-99)  mg/dL


 


HDL Cholesterol  35 L   (40-60)  mg/dL














Assessment and Plan


(1) ST elevation myocardial infarction (STEMI) of anterior wall


Status: Acute   





(2) Presence of stent in LAD coronary artery


Status: Acute   





(3) Hyperlipemia


Status: Acute   


Plan: 


from cardiology's perspective, we'll continue the patient on his current 

medications.he is scheduled to undergo angioplasty and stenting of the 

circumflex artery tomorrow.





DNP note has been reviewed, I agree with a documented findings and plan of 

care.  Patient was seen and examined.

## 2017-07-25 NOTE — HP
SUBJECTIVE:  This is a 62 -year-old white male developed left sided abdominal 
pain radiating to the left shoulder with history of pancreatitis.  He was also 
having chest pain.  He was brought to the hospital. He was evaluated.  He was 
thought to have a positive myocardial infarction three days prior to admission 
with severe chest pain at which time was taken to the Cardiac Cath lab and put 
a stent in his LAD.  He is in ICU at this time.  



ALLERGIES:  No known drug allergies. 



PAST MEDICAL HISTORY: Diabetes mellitus. 



No smoking. No alcohol.  No illicit drugs history. 



REVIEW OF SYSTEMS:  Negative except for what is mentioned in HPI.



PHYSICAL EXAMINATION: Vital signs stable.  Afebrile.  He is thin, cachectic.  
Alert and oriented times three. Psych:  Fair mood and affect.  Lungs are clear. 
GI: Soft.  Hematological:  Negative Homans. NEUROLOGICAL: Alert and oriented 
times three.  Skin no rash, excoriation or bruises.  Temperature 100.  Pulse 
100 to 108.  Respiratory rate 18 to 20.  Blood pressure 130s/80s.  O2 98% on 
room air.  



EKG prolonged QT, LVH. (   ) elevation V4 to V6 at which time cardiac 
catheterization was done with stent to the LAD.  STEMI alert was placed.  He is 
up in ICU resting comfortably.  Risk factor modifications pending.  

White count 12.3, hemoglobin 16.2, creatinine 0.8, sugars are in 170s to 180s.  
Lipase 87.   INR 1.0.  



ASSESSMENT: 

1.   ST elevated myocardial infarction status post stent to the LAD.

2.   Diffuse coronary artery disease.

3.   Anxiety. 



PLAN: Risk factor modification.  Follow up with stent in the next couple of 
days and then before discharge.  Risk factor modifications.  High dose statin 
will be given as well as blood pressure medications and aspirin. 
MTDD

## 2017-07-26 VITALS — TEMPERATURE: 97.3 F

## 2017-07-26 LAB
GLUCOSE BLD-MCNC: 162 MG/DL (ref 75–99)
GLUCOSE BLD-MCNC: 174 MG/DL (ref 75–99)
GLUCOSE BLD-MCNC: 185 MG/DL (ref 75–99)
GLUCOSE BLD-MCNC: 221 MG/DL (ref 75–99)
GLUCOSE BLD-MCNC: 256 MG/DL (ref 75–99)

## 2017-07-26 PROCEDURE — 027034Z DILATION OF CORONARY ARTERY, ONE ARTERY WITH DRUG-ELUTING INTRALUMINAL DEVICE, PERCUTANEOUS APPROACH: ICD-10-PCS

## 2017-07-26 RX ADMIN — SPIRONOLACTONE SCH MG: 25 TABLET, FILM COATED ORAL at 09:27

## 2017-07-26 RX ADMIN — METOPROLOL TARTRATE SCH MG: 25 TABLET, FILM COATED ORAL at 20:39

## 2017-07-26 RX ADMIN — PRASUGREL SCH: 10 TABLET, FILM COATED ORAL at 06:14

## 2017-07-26 RX ADMIN — PRASUGREL SCH: 10 TABLET, FILM COATED ORAL at 08:53

## 2017-07-26 RX ADMIN — INSULIN LISPRO SCH UNIT: 100 INJECTION, SOLUTION INTRAVENOUS; SUBCUTANEOUS at 17:29

## 2017-07-26 RX ADMIN — ACETAMINOPHEN PRN MG: 325 TABLET, FILM COATED ORAL at 20:39

## 2017-07-26 RX ADMIN — METOPROLOL TARTRATE SCH MG: 25 TABLET, FILM COATED ORAL at 06:48

## 2017-07-26 RX ADMIN — INSULIN LISPRO SCH UNIT: 100 INJECTION, SOLUTION INTRAVENOUS; SUBCUTANEOUS at 23:24

## 2017-07-26 RX ADMIN — ASPIRIN 81 MG CHEWABLE TABLET SCH: 81 TABLET CHEWABLE at 06:26

## 2017-07-26 RX ADMIN — INSULIN LISPRO SCH: 100 INJECTION, SOLUTION INTRAVENOUS; SUBCUTANEOUS at 06:49

## 2017-07-26 RX ADMIN — INSULIN LISPRO SCH: 100 INJECTION, SOLUTION INTRAVENOUS; SUBCUTANEOUS at 21:40

## 2017-07-26 RX ADMIN — LISINOPRIL SCH MG: 10 TABLET ORAL at 06:45

## 2017-07-26 RX ADMIN — INSULIN LISPRO SCH UNIT: 100 INJECTION, SOLUTION INTRAVENOUS; SUBCUTANEOUS at 12:27

## 2017-07-26 RX ADMIN — ATORVASTATIN CALCIUM SCH MG: 80 TABLET, FILM COATED ORAL at 20:39

## 2017-07-27 VITALS — HEART RATE: 103 BPM | SYSTOLIC BLOOD PRESSURE: 117 MMHG | DIASTOLIC BLOOD PRESSURE: 84 MMHG | RESPIRATION RATE: 18 BRPM

## 2017-07-27 LAB
ANION GAP SERPL CALC-SCNC: 10 MMOL/L
B BURGDOR IGG SERPL QL IA: 0 INDEX
BUN SERPL-SCNC: 18 MG/DL (ref 9–20)
CALCIUM SPEC-MCNC: 8.9 MG/DL (ref 8.4–10.2)
CHLORIDE SERPL-SCNC: 107 MMOL/L (ref 98–107)
CO2 SERPL-SCNC: 25 MMOL/L (ref 22–30)
GLUCOSE BLD-MCNC: 145 MG/DL (ref 75–99)
GLUCOSE SERPL-MCNC: 145 MG/DL (ref 74–99)
LYME IGG/IGM INTERP: NEGATIVE
NON-AFRICAN AMERICAN GFR(MDRD): >60
POTASSIUM SERPL-SCNC: 4.3 MMOL/L (ref 3.5–5.1)
SODIUM SERPL-SCNC: 142 MMOL/L (ref 137–145)

## 2017-07-27 RX ADMIN — INSULIN LISPRO SCH: 100 INJECTION, SOLUTION INTRAVENOUS; SUBCUTANEOUS at 06:50

## 2017-07-27 RX ADMIN — LISINOPRIL SCH MG: 10 TABLET ORAL at 08:41

## 2017-07-27 RX ADMIN — PRASUGREL SCH MG: 10 TABLET, FILM COATED ORAL at 08:41

## 2017-07-27 RX ADMIN — ASPIRIN 81 MG CHEWABLE TABLET SCH MG: 81 TABLET CHEWABLE at 08:42

## 2017-07-27 RX ADMIN — SPIRONOLACTONE SCH MG: 25 TABLET, FILM COATED ORAL at 08:41

## 2017-07-27 RX ADMIN — METOPROLOL TARTRATE SCH MG: 25 TABLET, FILM COATED ORAL at 08:41

## 2017-07-27 NOTE — PTCA
Mr. Lea is a 62-year-old male with a known history of diabetes who presented 
with a myocardial infarction and was found to have a subtotally occluded mid LAD
, underwent stenting of that vessel on the 24th of July. At that time, he was 
found to have critical stenosis involving the second obtuse marginal branch.  
In view of that, recommendation regarding angioplasty and stenting.  The 
procedure as well as the risks and the complications were discussed with the 
patient and is in full understanding and agreement. 



PROCEDURE:  The patient was brought to the cath lab in the fasting semi-sedated 
state after receiving fentanyl and Benadryl and achieving moderate conscious 
sedated state, using Xylocaine anesthesia and Seldinger technique, a 6-Spanish 
sheath was induced in the right radial artery.  Attempt to cannulate the left 
main using a 6 Spanish FL3.5, 6 Spanish EBU 3.75, a Ikari left 3.75 were 
unsuccessful.  Subsequently an Ikari 4 left was successful in cannulating the 
left main.  After cannulating the left main, a 0.014 balanced medium weight J 
wire was advised across the proximal left circumflex positioned in the first 
obtuse marginal branch. Subsequently, attempt to advance the 0.014 whisper J 
wire in the second obtuse marginal branch were unsuccessful because of the 
tortuosity.  At that point, a SuperCross 45 degree catheter was introduced and 
with the help of the catheter, wire was advanced into the second obtuse 
marginal branch. Subsequently, the SuperCross catheter was removed and a 2.25 x 
12 mm Trek balloon was advanced, one inflation was done at 8 atmospheres.  
Following that, the balloon was removed and 2.5 x 12 mm Xience Alpine stent was 
deployed and post dilated at 14 atmospheres. After the last inflation, after 
appropriate wait, the balloon and the guidewire were withdrawn back in the 
guiding catheter.  Images were obtained and repeated.  Those images reveal 
stable successful stenting. At that point, the guiding catheter, the balloon 
and the guidewire were removed.  The sheath was removed.  Hemostasis was 
obtained with deployment of TR band. There was no immediate complication.  The 
patient was returned to his room in stable condition.  Of note, the patient had 
no chest discomfort or EKG changes with the inflation. He received Angiomax per 
protocol and received intra-atrial Verapamil at the start of the procedure. 



RESULTS:  Successful stenting of the second obtuse marginal branch with 
reduction in stenosis from 99% to 0%. 



RECOMMENDATION:  Patient will be continued on aspirin, Effient, beta blocker, 
Ace inhibitor and statin.  The importance of dual antiplatelet treatment was 
discussed with the patient and his family and is in full understanding and 
agreement. 



Duration of the procedure is 56 minutes. 
MTDD

## 2017-07-27 NOTE — P.PN
Subjective


Principal diagnosis: 





anterior wall MI


 This is a 62-year-old  gentleman who presented to the hospital with 

an anterior wall myocardial infarction, he underwent angioplasty with stent 

placement of the LAD.  Sequential that patient underwent angioplasty with 

stenting of the circumflex artery yesterday by Dr. Faria.  Patient was seen 

and examined this morning, denies any chest pain or difficulty in breathing.  

He has been up ambulating without any difficulty.  Shows normal sinus rhythm 

with no changes from post-PCI.  Blood pressure 116/80 with a heart rate in the 

80s.





Objective





- Vital Signs


Vital signs: 


 Vital Signs











Temp  97.3 F L  07/26/17 23:28


 


Pulse  103 H  07/27/17 08:00


 


Resp  18   07/27/17 08:00


 


BP  117/84   07/27/17 08:00


 


Pulse Ox  97   07/27/17 08:48








 Intake & Output











 07/26/17 07/27/17 07/27/17





 18:59 06:59 18:59


 


Intake Total 516.5 300 180


 


Output Total 0  


 


Balance 516.5 300 180


 


Weight  74.2 kg 


 


Intake:   


 


  .5  


 


  Intake, IV Titration 80 0 





  Amount   


 


    Sodium Chloride 0.9% 1, 80 0 





    000 ml @ 100 mls/hr IV .   





    Q10H [facility] Rx#:[id number]   


 


  Oral 180 300 180


 


Output:   


 


  Urine 0  


 


Other:   


 


  # Voids   1














- Exam





PHYSICAL EXAMINATION: 





HEENT: Head is atraumatic, normocephalic.  Pupils equal, round.  Neck is 

supple.  There is no elevated jugular venous pressure.





HEART EXAMINATION: [Heart S1, S2 normal.  No murmur or gallop heard.]





CHEST EXAMINATION:[ Lungs are clear to auscultation and precussion. No chest 

wall tenderness is noted on palpation or with deep breathing.]





ABDOMEN: [ Soft, nontender. Bowel sounds are heard. No organomegaly noted]

.right groin soft, no evidence of any hematoma.


 


EXTREMITIES:[ 2+ peripheral pulses with no evidence of peripheral edema and no 

calf tenderness noted].  Right radial site clean and dry, good distal pulse.





NEUROLOGIC [patient is awake, alert and oriented -3.]


 


.


 








- Labs


CBC & Chem 7: 


 07/23/17 23:20





 07/27/17 06:01


Labs: 


 Abnormal Lab Results - Last 24 Hours (Table)











  07/26/17 07/26/17 07/26/17 Range/Units





  16:53 20:50 23:18 


 


Glucose     (74-99)  mg/dL


 


POC Glucose (mg/dL)  185 H  256 H  221 H  (75-99)  mg/dL














  07/27/17 07/27/17 Range/Units





  06:01 06:08 


 


Glucose  145 H   (74-99)  mg/dL


 


POC Glucose (mg/dL)   145 H  (75-99)  mg/dL














Assessment and Plan


(1) ST elevation myocardial infarction (STEMI) of anterior wall


Status: Acute   





(2) Presence of stent in LAD coronary artery


Status: Acute   





(3) Hyperlipemia


Status: Acute   





(4) Presence of stent in left circumflex coronary artery


Status: Acute   


Plan: 


from cardiology's perspective, he may be able to be discharged home today.  He 

will be discharged home on aspirin 81 mg daily, Lipitor 80 mg daily, lisinopril 

5 mg daily, Lopressor 25 mg one tablet by mouth twice a day, Effient 10 mg daily

, and sublingual nitroglycerin as needed for chest pain along with his diabetic 

meds.  He has been given instructions regarding his medication, diet, activity, 

and follow-up appointment.





DNP note has been reviewed, I agree with a documented findings and plan of 

care.  Patient was seen and examined.

## 2017-07-27 NOTE — PN
SUBJECTIVE:  A 62-year-old white male, status post angioplasty with circumflex 
RV as well as LAD. He is improving from a medical standpoint with him going off 
Invokana med for diabetes mellitus and (    ) as a cardiac indication and (    
) wanted for toe amputations.  



Vital signs were reviewed.  Medications reviewed. 



Patient wants to be discharged home tomorrow by 9 a.m. 



Vital signs stable, afebrile.

CARDIOVASCULAR:  S1, S2.

LUNGS:  Transmitted upper airway sounds.

HEMATOLOGIC:  Negative Homans sign, fair mood and affect.  

NEUROLOGIC:  Alert and oriented x3.



Sugar is in the mid-100s.  No recent labs have been done except for the glucose 
levels.



ASSESSMENT:  

1.  Status post angioplasty of the left anterior descending artery.

2.  Circumflex coronary artery disease.

3.  Anxiety.

4.  Dyslipidemia with low HDL.

5.  Diabetes mellitus with Hemoglobin A1c of 8.1.  

6.  Anxiety.



PLAN:  Continue with current treatments, switch Invokana med to Jardience with 
(     ) once a day XR.  Continue with antiplatelet agents, beta blockers, 
cholesterol pills 24 hours from now.  
MTDD

## 2019-02-25 ENCOUNTER — NEW PATIENT COMPREHENSIVE (OUTPATIENT)
Dept: URBAN - METROPOLITAN AREA CLINIC 35 | Facility: CLINIC | Age: 64
End: 2019-02-25

## 2019-02-25 DIAGNOSIS — H25.813: ICD-10-CM

## 2019-02-25 DIAGNOSIS — E10.9: ICD-10-CM

## 2019-02-25 DIAGNOSIS — H53.001: ICD-10-CM

## 2019-02-25 PROCEDURE — 92015 DETERMINE REFRACTIVE STATE: CPT

## 2019-02-25 PROCEDURE — 99204 OFFICE O/P NEW MOD 45 MIN: CPT

## 2019-02-25 ASSESSMENT — VISUAL ACUITY
OS_CC: J3
OD_PH: 20/100
OS_SC: 20/30

## 2019-02-25 ASSESSMENT — TONOMETRY
OD_IOP_MMHG: 25
OS_IOP_MMHG: 21

## 2019-07-05 ENCOUNTER — HOSPITAL ENCOUNTER (EMERGENCY)
Dept: HOSPITAL 47 - EC | Age: 64
Discharge: HOME | End: 2019-07-05
Payer: COMMERCIAL

## 2019-07-05 VITALS
HEART RATE: 63 BPM | TEMPERATURE: 98.3 F | RESPIRATION RATE: 20 BRPM | DIASTOLIC BLOOD PRESSURE: 71 MMHG | SYSTOLIC BLOOD PRESSURE: 116 MMHG

## 2019-07-05 DIAGNOSIS — Z82.61: ICD-10-CM

## 2019-07-05 DIAGNOSIS — M70.62: Primary | ICD-10-CM

## 2019-07-05 PROCEDURE — 96372 THER/PROPH/DIAG INJ SC/IM: CPT

## 2019-07-05 PROCEDURE — 73502 X-RAY EXAM HIP UNI 2-3 VIEWS: CPT

## 2019-07-05 PROCEDURE — 99283 EMERGENCY DEPT VISIT LOW MDM: CPT

## 2019-07-05 NOTE — ED
General Adult HPI





- General


Chief complaint: Extremity Injury, Lower


Stated complaint: Lt Hip Pain


Time Seen by Provider: 07/05/19 06:45


Source: patient, family, RN notes reviewed


Mode of arrival: ambulatory


Limitations: no limitations





- History of Present Illness


Initial comments: 





This is a 64-year-old male comes emergency Department complaining of left 

lateral hip pain.  Patient states he drove home smoke home and not every stop he

was having a hard time walking because of left lateral hip pain.  Patient states

she was no injury he states he has a very nice car.  Patient states it went away

when he got home and then he went to bed last night in the middle the night he 

woke up it was really hurting him again psychiatric emergency department.  P

atient took no medications for the pain.  Patient states is no back pain there 

is no distal leg pain.  Patient states there's been no trauma or injury to the 

area.  Patient states it's worse with walking but he can palpate the lateral 

aspect right over the greater trochanter and that causes him pain.  Patient 

denies any other problems patient denies any swelling redness.  Patient denies 

any fever chills patient denies any calf pain or leg swelling





- Related Data


                                  Previous Rx's











 Medication  Instructions  Recorded


 


Aspirin 81 mg PO DAILY #30 07/27/17


 


Atorvastatin [Lipitor] 80 mg PO HS #30 tab 07/27/17


 


Canagliflozin/Metformin HCl 1 tab PO BID #0 07/27/17





[Invokamet 150-1,000 mg Tablet]  


 


Lisinopril [Zestril] 5 mg PO DAILY #30 tab 07/27/17


 


Metoprolol Tartrate [Lopressor] 25 mg PO BID #60 tab 07/27/17


 


Nitroglycerin Sl Tabs [Nitrostat] 0.4 mg SUBLINGUAL Q5M PRN #25 tab 07/27/17


 


Prasugrel [Effient] 10 mg PO DAILY #30 tab 07/27/17


 


Spironolactone [Aldactone] 25 mg PO DAILY #30 tab 07/27/17


 


Ketorolac [Toradol] 10 mg PO Q6HR #15 tab 07/05/19











                                    Allergies











Allergy/AdvReac Type Severity Reaction Status Date / Time


 


No Known Allergies Allergy   Verified 07/05/19 06:15














Review of Systems


ROS Statement: 


Those systems with pertinent positive or pertinent negative responses have been 

documented in the HPI.





ROS Other: All systems not noted in ROS Statement are negative.





Past Medical History


Past Medical History: Diabetes Mellitus


History of Any Multi-Drug Resistant Organisms: None Reported


Past Surgical History: Cholecystectomy, Heart Catheterization With Stent


Past Anesthesia/Blood Transfusion Reactions: No Reported Reaction


Past Psychological History: No Psychological Hx Reported


Smoking Status: Never smoker


Past Alcohol Use History: Occasional


Past Drug Use History: None Reported





- Past Family History


  ** Mother


Family Medical History: Osteoarthritis (OA)





General Exam





- General Exam Comments


Initial Comments: 





GENERAL:


Patient is well-developed and well-nourished.  Patient is nontoxic and well-

hydrated and is in mild distress.





ENT:


Neck has full range of motion without eliciting any pain.





EYES:


The sclera were anicteric and conjunctiva were pink and moist.  Extraocular 

movements were intact and pupils were equal round and reactive to light.  

Eyelids were unremarkable.





SKIN:


Skin is clear with no lesions or rashes and otherwise unremarkable.





NEUROLOGIC:


Patient is alert and oriented x3.  Cranial nerves II through XII are grossly 

intact.  Motor and sensory are also intact.  Normal speech, volume and content. 

 Symmetrical smile. 





MUSCULOSKELETAL:


Patient has pain over the greater trochanter left hip





LYMPHATICS:


No significant lymphadenopathy is noted





PSYCHIATRIC:


Normal psychiatric evaluation.  


Limitations: no limitations





Course


                                   Vital Signs











  07/05/19





  06:10


 


Temperature 98.3 F


 


Pulse Rate 63


 


Respiratory 20





Rate 


 


Blood Pressure 116/71


 


O2 Sat by Pulse 97





Oximetry 














Medical Decision Making





- Medical Decision Making





x-ray is normal of the hip





Disposition


Clinical Impression: 


 Greater trochanteric bursitis





Disposition: HOME SELF-CARE


Condition: Good


Instructions (If sedation given, give patient instructions):  Hip Bursitis (ED)


Prescriptions: 


Ketorolac [Toradol] 10 mg PO Q6HR #15 tab


Is patient prescribed a controlled substance at d/c from ED?: No


Referrals: 


Nonstaff,Physician [Primary Care Provider] - 1-2 days

## 2019-07-05 NOTE — XR
EXAM:

  XR Left Hip, 2 views

 

CLINICAL HISTORY:  Pain

 

TECHNIQUE:

  Frontal and frog-leg lateral views of left hip.

 

COMPARISON:

  No relevant prior studies available.

 

FINDINGS:

  Bones/joints:  Unremarkable.  No acute fracture.  No dislocation.  Mild 

small spur formation at the acetabular roof

  Soft tissues:  Unremarkable.

 

IMPRESSION:     

No acute findings

## 2020-10-21 ENCOUNTER — ESTABLISHED COMPREHENSIVE EXAM (OUTPATIENT)
Dept: URBAN - METROPOLITAN AREA CLINIC 35 | Facility: CLINIC | Age: 65
End: 2020-10-21

## 2020-10-21 DIAGNOSIS — H53.001: ICD-10-CM

## 2020-10-21 DIAGNOSIS — H52.7: ICD-10-CM

## 2020-10-21 DIAGNOSIS — H40.053: ICD-10-CM

## 2020-10-21 DIAGNOSIS — H25.813: ICD-10-CM

## 2020-10-21 DIAGNOSIS — E11.9: ICD-10-CM

## 2020-10-21 PROCEDURE — 92014 COMPRE OPH EXAM EST PT 1/>: CPT

## 2020-10-21 PROCEDURE — 92015 DETERMINE REFRACTIVE STATE: CPT

## 2020-10-21 ASSESSMENT — TONOMETRY
OD_IOP_MMHG: 24
OS_IOP_MMHG: 22

## 2020-10-21 ASSESSMENT — VISUAL ACUITY
OS_CC: J2
OD_SC: 20/200
OS_SC: 20/30-1

## 2022-03-08 ENCOUNTER — COMPREHENSIVE EXAM (OUTPATIENT)
Dept: URBAN - METROPOLITAN AREA CLINIC 35 | Facility: CLINIC | Age: 67
End: 2022-03-08

## 2022-03-08 DIAGNOSIS — E11.9: ICD-10-CM

## 2022-03-08 DIAGNOSIS — H02.886: ICD-10-CM

## 2022-03-08 DIAGNOSIS — H40.053: ICD-10-CM

## 2022-03-08 DIAGNOSIS — H25.813: ICD-10-CM

## 2022-03-08 DIAGNOSIS — H53.001: ICD-10-CM

## 2022-03-08 DIAGNOSIS — H52.7: ICD-10-CM

## 2022-03-08 DIAGNOSIS — H02.883: ICD-10-CM

## 2022-03-08 PROCEDURE — 92015 DETERMINE REFRACTIVE STATE: CPT

## 2022-03-08 PROCEDURE — 92014 COMPRE OPH EXAM EST PT 1/>: CPT

## 2022-03-08 ASSESSMENT — TONOMETRY
OD_IOP_MMHG: 24
OD_IOP_MMHG: 23
OS_IOP_MMHG: 28

## 2022-03-08 ASSESSMENT — VISUAL ACUITY
OD_SC: 20/100-1
OS_SC: J10
OS_SC: 20/30-1

## 2023-09-21 ENCOUNTER — COMPREHENSIVE EXAM (OUTPATIENT)
Dept: URBAN - METROPOLITAN AREA CLINIC 35 | Facility: CLINIC | Age: 68
End: 2023-09-21

## 2023-09-21 DIAGNOSIS — H02.886: ICD-10-CM

## 2023-09-21 DIAGNOSIS — H02.883: ICD-10-CM

## 2023-09-21 DIAGNOSIS — H52.7: ICD-10-CM

## 2023-09-21 DIAGNOSIS — E11.9: ICD-10-CM

## 2023-09-21 DIAGNOSIS — H53.001: ICD-10-CM

## 2023-09-21 DIAGNOSIS — H25.813: ICD-10-CM

## 2023-09-21 PROCEDURE — 92014 COMPRE OPH EXAM EST PT 1/>: CPT

## 2023-09-21 PROCEDURE — 92015 DETERMINE REFRACTIVE STATE: CPT

## 2023-09-21 ASSESSMENT — VISUAL ACUITY
OS_CC: J1
OS_SC: 20/25
OU_CC: J1
OD_CC: J12
OU_SC: 20/25+1
OD_SC: 20/100+1

## 2023-09-21 ASSESSMENT — TONOMETRY
OD_IOP_MMHG: 14
OS_IOP_MMHG: 15

## 2024-10-01 ENCOUNTER — COMPREHENSIVE EXAM (OUTPATIENT)
Dept: URBAN - METROPOLITAN AREA CLINIC 35 | Facility: CLINIC | Age: 69
End: 2024-10-01

## 2024-10-01 DIAGNOSIS — H53.001: ICD-10-CM

## 2024-10-01 DIAGNOSIS — H02.886: ICD-10-CM

## 2024-10-01 DIAGNOSIS — E11.9: ICD-10-CM

## 2024-10-01 DIAGNOSIS — H52.7: ICD-10-CM

## 2024-10-01 DIAGNOSIS — H25.813: ICD-10-CM

## 2024-10-01 DIAGNOSIS — H02.883: ICD-10-CM

## 2024-10-01 PROCEDURE — 92015 DETERMINE REFRACTIVE STATE: CPT

## 2024-10-01 PROCEDURE — 92014 COMPRE OPH EXAM EST PT 1/>: CPT

## 2024-10-01 PROCEDURE — 92250 FUNDUS PHOTOGRAPHY W/I&R: CPT

## 2025-04-08 ENCOUNTER — COMPREHENSIVE EXAM (OUTPATIENT)
Age: 70
End: 2025-04-08

## 2025-04-08 DIAGNOSIS — E11.9: ICD-10-CM

## 2025-04-08 DIAGNOSIS — H53.001: ICD-10-CM

## 2025-04-08 DIAGNOSIS — H02.886: ICD-10-CM

## 2025-04-08 DIAGNOSIS — H25.813: ICD-10-CM

## 2025-04-08 DIAGNOSIS — H02.883: ICD-10-CM

## 2025-04-08 DIAGNOSIS — Z79.4: ICD-10-CM

## 2025-04-08 DIAGNOSIS — H52.7: ICD-10-CM

## 2025-04-08 PROCEDURE — 92134 CPTRZ OPH DX IMG PST SGM RTA: CPT

## 2025-04-08 PROCEDURE — 92014 COMPRE OPH EXAM EST PT 1/>: CPT
